# Patient Record
(demographics unavailable — no encounter records)

---

## 2024-12-10 NOTE — DISCUSSION/SUMMARY
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 Currently euthyroid. Her current dose will be maintained with follow-up in three months.

## 2024-12-10 NOTE — DATA REVIEWED
[FreeTextEntry1] : Labs 8/30/23 TSH 0.763 FT4 1.95 (inc) T4 11.8 10/19/23 TSH 5.37 (inc) FT4 1.3 12/18/23 TSH 4.78 (inc) FT4 1.4 3/7/24 TSH 1.82 FT4 1.4 8/16/24 TSH 0.44 (low) FT4 1.7 (inc) *11/29/24 TSH 1.88 Free T4 1.5

## 2024-12-10 NOTE — PHYSICAL EXAM
[Healthy Appearing] : healthy appearing [Well Nourished] : well nourished [Interactive] : interactive [WNL for age] : within normal limits of age [Normal S1 and S2] : normal S1 and S2 [Clear to Ausculation Bilaterally] : clear to auscultation bilaterally [Abdomen Soft] : soft [Abdomen Tenderness] : non-tender [] : no hepatosplenomegaly [1] : was Matheus stage 1 [Normal Appearance] : normal in appearance [Matheus Stage ___] : the Matheus stage for breast development was [unfilled] [Normal] : normal  [Goiter] : no goiter [Murmur] : no murmurs [Scoliosis] : scoliosis not appreciated [de-identified] : well appearing [de-identified] : normal pharynx [FreeTextEntry2] : no axillary hair [de-identified] : normal patellar DTRs, no tremor, no fasciculations

## 2024-12-10 NOTE — HISTORY OF PRESENT ILLNESS
[Premenarchal] : premenarchal [Heat Intolerance] : no heat intolerance [FreeTextEntry2] : Jacque is an 9yr old F with congenital hypothyroidism. Last visit dose was lowered from 75 to 75 and 67.5mcg alternating. Taking T4 daily, no missed doses. She takes her medication at 7:30 in the AM.  Appetite is good. She is a picky eater but that is her baseline.  No constipation/diarrhea. No cold/heat intolerance.   No difficulty focusing. Good energy. She is sleeping well. She gets 10 1/2 hours of sleep at night.   SHx - 4th grade, no concerns. She gets exercise at gym 2x a week.  She denies any recent illnesses No hospitalizations or surgeries She takes a multivitamin and vitamin C separate from the pill. Per mom she is still prepubertal [TWNoteComboBox1] : congenital hypothyroidism

## 2024-12-10 NOTE — CONSULT LETTER
[Dear  ___] : Dear  [unfilled], [Courtesy Letter:] : I had the pleasure of seeing your patient, [unfilled], in my office today. [Please see my note below.] : Please see my note below. [Consult Closing:] : Thank you very much for allowing me to participate in the care of this patient.  If you have any questions, please do not hesitate to contact me. [Sincerely,] : Sincerely, [FreeTextEntry3] : Digna Gonzaelz MD\par  Pediatric Endocrinology\par  [facility]\par  [facility]\par

## 2024-12-10 NOTE — CONSULT LETTER
[Dear  ___] : Dear  [unfilled], [Courtesy Letter:] : I had the pleasure of seeing your patient, [unfilled], in my office today. [Please see my note below.] : Please see my note below. [Consult Closing:] : Thank you very much for allowing me to participate in the care of this patient.  If you have any questions, please do not hesitate to contact me. [Sincerely,] : Sincerely, [FreeTextEntry3] : Digna Gonzalez MD\par  Pediatric Endocrinology\par  [facility]\par  [facility]\par

## 2024-12-10 NOTE — REVIEW OF SYSTEMS
[Nl] : Neurological [NI] : Endocrine [Change in Activity] : no change in activity [Change in Appetite] : no change in appetite [Constipation] : no constipation [Sleep Disturbances] : ~T no sleep disturbances [Urinary Frequency] : no urinary frequency [Short Stature] : no short stature  [Cold Intolerance] : cold tolerant [Heat Intolerance] : heat tolerant

## 2024-12-10 NOTE — PHYSICAL EXAM
[Healthy Appearing] : healthy appearing [Well Nourished] : well nourished [Interactive] : interactive [WNL for age] : within normal limits of age [Normal S1 and S2] : normal S1 and S2 [Clear to Ausculation Bilaterally] : clear to auscultation bilaterally [Abdomen Soft] : soft [Abdomen Tenderness] : non-tender [] : no hepatosplenomegaly [1] : was Matheus stage 1 [Normal Appearance] : normal in appearance [Matheus Stage ___] : the Matheus stage for breast development was [unfilled] [Normal] : normal  [Goiter] : no goiter [Murmur] : no murmurs [Scoliosis] : scoliosis not appreciated [de-identified] : well appearing [de-identified] : normal pharynx [FreeTextEntry2] : no axillary hair [de-identified] : normal patellar DTRs, no tremor, no fasciculations

## 2025-04-24 NOTE — CONSULT LETTER
[Dear  ___] : Dear  [unfilled], [Courtesy Letter:] : I had the pleasure of seeing your patient, [unfilled], in my office today. [Please see my note below.] : Please see my note below. [Consult Closing:] : Thank you very much for allowing me to participate in the care of this patient.  If you have any questions, please do not hesitate to contact me. [Sincerely,] : Sincerely, [FreeTextEntry3] : Digna Gonzalez MD\par  Pediatric Endocrinology\par  [facility]\par  [facility]\par

## 2025-04-24 NOTE — DISCUSSION/SUMMARY
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 Currently hypothyroid, likely a change just since recent illness.  She is symptomatic.  To slightly increase dose (very sensitive to dose changes) with repeat labs to be done in 4-6 weeks.

## 2025-04-24 NOTE — PHYSICAL EXAM
[Healthy Appearing] : healthy appearing [Well Nourished] : well nourished [Interactive] : interactive [WNL for age] : within normal limits of age [Normal S1 and S2] : normal S1 and S2 [Clear to Ausculation Bilaterally] : clear to auscultation bilaterally [Abdomen Soft] : soft [Abdomen Tenderness] : non-tender [] : no hepatosplenomegaly [1] : was Matheus stage 1 [Normal Appearance] : normal in appearance [Matheus Stage ___] : the Matehus stage for breast development was [unfilled] [Normal] : normal  [Goiter] : no goiter [Murmur] : no murmurs [Scoliosis] : scoliosis not appreciated [de-identified] : well appearing [de-identified] : normal pharynx [FreeTextEntry2] : no axillary hair [de-identified] : brisk patellar DTRs

## 2025-04-24 NOTE — HISTORY OF PRESENT ILLNESS
[Premenarchal] : premenarchal [Heat Intolerance] : no heat intolerance [FreeTextEntry2] : Jacque is an 9yr old F with congenital hypothyroidism. Last visit dose was maintained at 75 and 67.5mcg alternating. Taking T4 daily, no missed dose, in the AM.  Appetite is good.No cold/heat intolerance.   No difficulty focusing. She is sleeping well. Had strep 2 weeks ago, finished antibiotics day after bloodwork.  Since more tired.  Last few days constipation.  [TWNoteComboBox1] : congenital hypothyroidism

## 2025-04-24 NOTE — REVIEW OF SYSTEMS
[Nl] : Neurological [NI] : Endocrine [Change in Activity] : change in activity [Constipation] : constipation [Change in Appetite] : no change in appetite [Sleep Disturbances] : ~T no sleep disturbances [Urinary Frequency] : no urinary frequency [Short Stature] : no short stature  [Cold Intolerance] : cold tolerant [Heat Intolerance] : heat tolerant

## 2025-04-24 NOTE — DATA REVIEWED
[FreeTextEntry1] : Labs 8/30/23 TSH 0.763 FT4 1.95 (inc) T4 11.8 10/19/23 TSH 5.37 (inc) FT4 1.3 12/18/23 TSH 4.78 (inc) FT4 1.4 3/7/24 TSH 1.82 FT4 1.4 8/16/24 TSH 0.44 (low) FT4 1.7 (inc) 11/29/24 TSH 1.88 Free T4 1.5 *4/17/25 TSH 10.51 (inc) FT41.5